# Patient Record
Sex: FEMALE | Race: WHITE | Employment: UNEMPLOYED | URBAN - METROPOLITAN AREA
[De-identification: names, ages, dates, MRNs, and addresses within clinical notes are randomized per-mention and may not be internally consistent; named-entity substitution may affect disease eponyms.]

---

## 2020-07-01 ENCOUNTER — OFFICE VISIT (OUTPATIENT)
Dept: NEUROLOGY | Age: 55
End: 2020-07-01

## 2020-07-01 VITALS
HEART RATE: 97 BPM | OXYGEN SATURATION: 98 % | HEIGHT: 65 IN | RESPIRATION RATE: 18 BRPM | BODY MASS INDEX: 30.99 KG/M2 | DIASTOLIC BLOOD PRESSURE: 82 MMHG | SYSTOLIC BLOOD PRESSURE: 120 MMHG | TEMPERATURE: 98.2 F | WEIGHT: 186 LBS

## 2020-07-01 DIAGNOSIS — R41.89 SUBJECTIVE MEMORY COMPLAINTS: Primary | ICD-10-CM

## 2020-07-01 RX ORDER — MULTIVITAMIN
1 TABLET ORAL DAILY
COMMUNITY

## 2020-07-01 RX ORDER — DENOSUMAB 60 MG/ML
60 INJECTION SUBCUTANEOUS
COMMUNITY

## 2020-07-01 RX ORDER — PANTOPRAZOLE SODIUM 40 MG/1
40 TABLET, DELAYED RELEASE ORAL 2 TIMES DAILY
COMMUNITY
Start: 2017-07-11

## 2020-07-01 RX ORDER — NITROGLYCERIN 0.4 MG/1
0.4 TABLET SUBLINGUAL
COMMUNITY
Start: 2017-07-03

## 2020-07-01 RX ORDER — NALOXONE HYDROCHLORIDE 4 MG/.1ML
SPRAY NASAL
COMMUNITY
Start: 2020-03-20

## 2020-07-01 RX ORDER — MELATONIN
2000 DAILY
COMMUNITY

## 2020-07-01 RX ORDER — TIZANIDINE 4 MG/1
4 TABLET ORAL
COMMUNITY
Start: 2020-03-20

## 2020-07-01 RX ORDER — SENNOSIDES 8.6 MG/1
2 TABLET ORAL
COMMUNITY
Start: 2020-03-20

## 2020-07-01 RX ORDER — ONDANSETRON 4 MG/1
4 TABLET, ORALLY DISINTEGRATING ORAL
COMMUNITY
Start: 2016-09-23

## 2020-07-01 RX ORDER — ASPIRIN 81 MG/1
81 TABLET ORAL 2 TIMES DAILY
COMMUNITY
Start: 2020-03-20

## 2020-07-01 RX ORDER — HYDROCODONE BITARTRATE AND ACETAMINOPHEN 7.5; 325 MG/1; MG/1
1 TABLET ORAL
COMMUNITY
Start: 2020-03-20

## 2020-07-01 RX ORDER — ALBUTEROL SULFATE 90 UG/1
AEROSOL, METERED RESPIRATORY (INHALATION)
COMMUNITY
Start: 2020-06-03 | End: 2020-07-01 | Stop reason: SDUPTHER

## 2020-07-01 RX ORDER — ALBUTEROL SULFATE 90 UG/1
AEROSOL, METERED RESPIRATORY (INHALATION)
COMMUNITY
Start: 2020-06-03

## 2020-07-01 RX ORDER — POLYETHYLENE GLYCOL 3350 17 G/17G
17 POWDER, FOR SOLUTION ORAL
COMMUNITY
Start: 2016-12-09

## 2020-07-01 NOTE — PROGRESS NOTES
Referring Physician: Self-referred    Reason for Consultation:  ? Parkinsons     Chief Complaint: tremor in the head     History of Present Illness:   Joe Young is a 50 y.o. female with a history of asthma, osteoporosis and interstitial cystitis who presents to neurology clinic for evaluation of a head tremor and balance problems. Reports that she has been experiencing symptoms of tremor in her head which is noted at rest for the last 6 months. Believes that this may have been ongoing prior to this however it has been noticed more by her  in the last few months. She reports that this usually occurs at rest when she is washing the dishes. She does report that she has some tightness in the neck however attributes this to a recent cervical spine surgery. She also does believe that her tremor is worse when she is anxious and if she is tired. She also reports that she may have had some intermittent tremor of her right upper extremity however this occurs very infrequently. She denies any trouble with her smell or constipation. She does not have any blurry vision or double vision. She reports her swallowing is normal and has not noticed any changes in her voice. Additionally patient does report that she has been having difficulty with her balance. She reports that she has been stumbling when walking occasionally and has noted veering to one side or the other. Denies taking any small shuffling steps and does not feel that her legs are heavy. She also complains of having memory troubles which is described more as losing her chain of thought. She frequently forgets what she was going to save midsentence. Denies any trouble with remembering people's names or repeating stories. She does endorse that she has been leaving the stove on and her  at times will need to turn it off. She also leaves the water running at times.   Denies getting lost while driving    Medical hx   Asthma Osteoporosis  GERD   Interstitial cystitis     Surgical Hx  Cholecystectomy   Tubal ligation   Cervical spine   Lumbar spine     Family History   Problem Relation Age of Onset    Parkinsonism Mother         Social History     Tobacco Use    Smoking status: Former Smoker    Smokeless tobacco: Never Used   Substance Use Topics    Alcohol use: Not Currently        Not on File     Prior to Admission medications    Medication Sig Start Date End Date Taking? Authorizing Provider   albuterol (PROVENTIL HFA, VENTOLIN HFA, PROAIR HFA) 90 mcg/actuation inhaler  6/3/20  Yes Provider, Historical   cholecalciferol (VITAMIN D3) (1000 Units /25 mcg) tablet Take 2,000 Units by mouth daily. Yes Provider, Historical   denosumab (Prolia) 60 mg/mL injection 60 mg by SubCUTAneous route. Yes Provider, Historical   ondansetron (ZOFRAN ODT) 4 mg disintegrating tablet Take 4 mg by mouth every eight (8) hours as needed. 9/23/16  Yes Provider, Historical   pantoprazole (PROTONIX) 40 mg tablet Take 40 mg by mouth two (2) times a day. 7/11/17  Yes Provider, Historical   polyethylene glycol (MIRALAX) 17 gram packet Take 17 g by mouth. 12/9/16  Yes Provider, Historical   aspirin delayed-release 81 mg tablet Take 81 mg by mouth two (2) times a day. 3/20/20   Provider, Historical   calcium-cholecalciferol, D3, (CALTRATE 600+D) tablet Take 1 Tab by mouth daily. Provider, Historical   HYDROcodone-acetaminophen (NORCO) 7.5-325 mg per tablet Take 1 Tab by mouth every four (4) hours as needed. 3/20/20   Provider, Historical   naloxone (NARCAN) 4 mg/actuation nasal spray Administer a single spray intranasally into one nostril if concern for overdose. Call 911. May repeat x1 in 2 to 3 minutes using a new nasal spray. 3/20/20   Provider, Historical   nitroglycerin (NITROSTAT) 0.4 mg SL tablet Take 0.4 mg by mouth. 7/3/17   Provider, Historical   senna (SENOKOT) 8.6 mg tablet Take 2 Tabs by mouth nightly.  3/20/20   Provider, Historical tiZANidine (ZANAFLEX) 4 mg tablet Take 4 mg by mouth every eight (8) hours as needed. 3/20/20   Provider, Historical       Review of Systems:  General, constitutional: negative  Eyes, vision: negative  Ears, nose, throat: negative  Cardiovascular, heart: negative  Respiratory: negative  Gastrointestinal: negative  Genitourinary: negative  Musculoskeletal: negative  Skin and integumentary: negative  Psychiatric: negative  Endocrine: negative  Neurological: negative, except for HPI  Hematologic/lymphatic: negative  Allergy/immunology: negative    Visit Vitals  /82   Pulse 97   Temp 98.2 °F (36.8 °C) (Oral)   Resp 18   Ht 5' 5\" (1.651 m)   Wt 186 lb (84.4 kg)   SpO2 98%   BMI 30.95 kg/m²       Physical Exam:  General:  no acute distress  Neck: no carotid bruits  Lungs: clear to auscultation  Heart:  no murmurs, regular rate and rhythm   Lower extremity: no edema    Neurological exam:  Mental Status: Awake, alert, oriented to person, place and time  Registration and Recall: registration intact, able to recall 3/3 words correctly at 5 min   Attention and Concentration: able to state the days of the week backwards   Speech and Language: No dysarthria. Able to name, repeat and follow commands   Fund of knowledge was preserved    Cranial nerves: II-XII  Pupils equal and reactive, visual fields intact by confrontation   Extraocular movements intact, no evidence of nystagmus or ptosis   Facial sensation intact   Facial movements symmetric   Hearing intact to soft rub bilaterally   Shoulder shrug symmetric and strong   Tongue protrusion full and midline without fasciculation or atrophy    Motor:   Normal tone and Bulk no cogwheel rigidity  Drift: No evidence of pronator drift   Abnormal movements: When checking for finger tapping's a slight head tremor was noted for a few seconds. Strength testing:   deltoid triceps biceps Wrist ext. Wrist flex. intrinsics   Right 5 5 5 5 5 5   Left 5 5 5 5 5 5      Hip flex.  Hip ext. Knee ext. Knee flex Dorsi flex Plantar flex   Right  5 5 5 5 5 5   Left  5 5 5 5 5 5       Sensory:  Sensation intact to light touch, no extinction     Reflexes:     Biceps Triceps  Brachiorad Patellar Achilles Plantar Hoffmans   Right  2 2 2 2 2 Down Neg   Left  2 2 2 2 2 Down Neg        Cerebellar testing:  No dysmetria. Normal rapid alternating movements; finger-to-nose and heel-to- shin testing are within normal limits. Finger tapping and foot stopping did reveal slight decrement in the size of her motions. Romberg: absent    Gait: steady. Did veer from one side to the other when walking down the hallway. Data:     No data for review is available. Assessment and Plan   Ivette High is a 50 y.o. female with a history of asthma, osteoporosis and interstitial cystitis who presents to neurology clinic for evaluation of a head tremor and balance problems of unclear etiology. The tremor as well as gait instability was not noted on my examination today. She did not have any features supportive of Parkinson's disorder at this time however given her family history she potentially may develop this disorder. Her neurological exam was essentially unremarkable.  -We will obtain an MRI of the brain to rule out any organic cause for her head tremor and gait instability.  -I did suggest that patient could undergo physical therapy however patient stated that her symptoms are not constant and would like to wait on this.  -Patient did inquire about genetic testing for Parkinson's and I discussed that we could potentially consider this if her symptoms were to worsen.  -We also discussed the possibility of a DaTscan to determine if she has evidence of early Parkinson's.     Memory complaints: Appear to be subjective as her neurological exam did not reveal any difficulty with recall.  -We will obtain neuropsychological testing to determine if there are true memory complaints or if she has underlying depression or anxiety which may be contributing to this. I have discussed the diagnosis with the patient and the intended plan as seen in the above orders. Patient is in agreement. The patient has received an after-visit summary and questions were answered concerning future plans. I have discussed medication side effects and warnings with the patient as well.         Signed By:  Yolanda Councilman, MD     July 1, 2020

## 2020-07-01 NOTE — PATIENT INSTRUCTIONS
Parkinson's Disease: Care Instructions Your Care Instructions Parkinson's disease can cause tremors, stiffness, and problems with movement. Severe or advanced cases can also cause problems with thinking. In Parkinson's disease, part of the brain cannot make enough dopamine, a chemical that helps control movement. Taking your medicines correctly and getting regular exercise may help you maintain your quality of life. There are many things that can cause Parkinson's disease symptoms, including some medicine, some toxins, and trauma to the head. The cause in most cases is not known. Follow-up care is a key part of your treatment and safety. Be sure to make and go to all appointments, and call your doctor if you are having problems. It's also a good idea to know your test results and keep a list of the medicines you take. How can you care for yourself at home? General care · Take your medicines exactly as prescribed. Call your doctor if you think you are having a problem with your medicine. · Make sure your home is safe: 
? Place furniture so that you have something to hold on to as you walk around the house. ? Use chairs that make it easier to sit down and stand up. ? Group the things you use most, such as reading glasses, keys, and the telephone, in one easy-to-reach place. ? Tack down rugs so that you do not trip. ? Put no-slip tape and handrails in the tub to prevent falls. · Use a cane, walker, or scooter if your doctor suggests it. · Keep up your normal activities as much as you can. · Find ways to manage stress, which can make symptoms worse. · Spend time with family and friends. Join a support group for people with Parkinson's disease if you want extra help. · Depression is common with this condition. Tell your doctor if you have trouble sleeping, are eating too much or are not hungry, or feel sad or tearful all the time. Depression can be treated with medicine and counseling. Diet and exercise · Eat a balanced diet. · If you are taking levodopa, do not eat protein at the same time you take your medicine. Levodopa may not work as well if you take it at the same time you eat protein. You can eat normal amounts of protein. Talk to your doctor if you have questions. · If you have problems swallowing, change how and what you eat: ? Try thick drinks, such as milk shakes. They are easier to swallow than other fluids. ? Do not eat foods that crumble easily. These can cause choking. ? Use a  to prepare food. Soft foods need less chewing. ? Eat small meals often so that you do not get tired from eating heavy meals. · Drink plenty of water and eat a high-fiber diet to prevent constipation. Parkinson'sand the medicines that treat itmay slow your intestines. · Get exercise on most days. Work with your doctor to set up a program of walking, swimming, or other exercise you are able to do. When should you call for help? Call your doctor now or seek immediate medical care if: 
· You have a change in your symptoms. · You develop other problems from your condition, such as: 
? Injury from a fall. ? Thinking or memory problems. ? A urinary tract infection (burning pain when urinating). Watch closely for changes in your health, and be sure to contact your doctor if: 
· You lose weight because of problems with eating. · You want more information about your condition or your medicines. Where can you learn more? Go to http://aung-romana.info/ Enter F625 in the search box to learn more about \"Parkinson's Disease: Care Instructions. \" Current as of: November 20, 2019               Content Version: 12.5 © 0888-4385 [employer]. Care instructions adapted under license by [employer] (which disclaims liability or warranty for this information).  If you have questions about a medical condition or this instruction, always ask your healthcare professional. [address] any warranty or liability for your use of this information.

## 2020-09-15 ENCOUNTER — VIRTUAL VISIT (OUTPATIENT)
Dept: NEUROLOGY | Age: 55
End: 2020-09-15
Payer: MEDICAID

## 2020-09-15 DIAGNOSIS — G31.84 MILD COGNITIVE IMPAIRMENT WITH MEMORY LOSS: Primary | ICD-10-CM

## 2020-09-15 PROCEDURE — 96116 NUBHVL XM PHYS/QHP 1ST HR: CPT | Performed by: PSYCHOLOGIST
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
## 2020-09-25 ENCOUNTER — TELEPHONE (OUTPATIENT)
Dept: NEUROLOGY | Age: 55
End: 2020-09-25

## 2020-10-07 ENCOUNTER — VIRTUAL VISIT (OUTPATIENT)
Dept: NEUROLOGY | Age: 55
End: 2020-10-07
Payer: COMMERCIAL

## 2020-10-07 DIAGNOSIS — R41.3 MEMORY IMPAIRMENT: Primary | ICD-10-CM

## 2020-10-07 PROCEDURE — 99214 OFFICE O/P EST MOD 30 MIN: CPT | Performed by: PSYCHIATRY & NEUROLOGY

## 2020-10-07 NOTE — PROGRESS NOTES
Neurology Note    Patient ID:  Vickie Beck  [id number]  12 y.o.  1965      Date of Consultation:  October 7, 2020    This is a telemedicine visit that was performed with in the originating site at patient's home and the distance site at [facility] outpatient clinic at [facility].  This telemedicine visit utilized synchronous (real-time) audio-video technology. Verbal consent to participate in the video visit was obtained. This visit occurred during the corona (COVID -19) public health emergency. I discussed with the patient the nature of our telemedicine visit, that:  - I would evaluate the patient and recommend diagnostic and treatment based on my assessment  - Our sessions are not being recorded and that personal health information is protected  - Our team will provide follow-up care in person if and when the patient needs it. Consent:  The patient is aware that this patient-initiated Telehealth encounter is a billable service, with coverage as determined by the patient's insurance carrier. The patient is aware that they may receive a bill and has provided verbal consent to proceed:     Subjective:     CC: tremor    History of Present Illness:   Vickie Beck is a 50 y.o. female with a history of asthma, osteoporosis and interstitial cystitis who presents to neurology clinic for ongoing evaluation of a head tremor and cognitive impairment. Interval history  Since patient's last visit approximately 3 months ago she has had no progression or improvement in her head tremor. Additionally she continues to complain of issues with her memory. She did undergo neuropsych testing which showed a mild cognitive impairment. She does report that her head tremor is usually present when she is distracted with certain tasks such as washing dishes. She says that this does not cause her significant discomfort.     Past Medical History:   Diagnosis Date    Asthma     Bronchitis     Gastrointestinal disorder     GERD    GERD (gastroesophageal reflux disease)     Thromboembolus (HCC)     DVT behind knees        Past Surgical History:   Procedure Laterality Date    HX CHOLECYSTECTOMY      HX GYN      tubaligation    HX ORTHOPAEDIC      back, leg, arm        Family History   Problem Relation Age of Onset    Parkinsonism Mother     Heart Attack Father     Heart Disease Father 62    Other Father         MVA    Other Daughter         pulm stenosis    Other Son         CP    Breast Cancer Mother 44    Cancer Sister         endometrial cancer        Social History     Tobacco Use    Smoking status: Former Smoker    Smokeless tobacco: Never Used   Substance Use Topics    Alcohol use: Not Currently        Allergies   Allergen Reactions    Bee Sting [Sting, Bee] Anaphylaxis    Other Medication Anaphylaxis     Bee stings        Prior to Admission medications    Medication Sig Start Date End Date Taking? Authorizing Provider   albuterol (PROVENTIL HFA, VENTOLIN HFA, PROAIR HFA) 90 mcg/actuation inhaler  6/3/20  Yes Provider, Historical   cholecalciferol (VITAMIN D3) (1000 Units /25 mcg) tablet Take 2,000 Units by mouth daily. Yes Provider, Historical   denosumab (Prolia) 60 mg/mL injection 60 mg by SubCUTAneous route. Yes Provider, Historical   HYDROcodone-acetaminophen (NORCO) 7.5-325 mg per tablet Take 1 Tab by mouth every four (4) hours as needed. 3/20/20  Yes Provider, Historical   ondansetron (ZOFRAN ODT) 4 mg disintegrating tablet Take 4 mg by mouth every eight (8) hours as needed. 9/23/16  Yes Provider, Historical   nitroglycerin (NITROSTAT) 0.4 mg SL tablet Take 0.4 mg by mouth. 7/3/17  Yes Provider, Historical   aspirin delayed-release 81 mg tablet Take 81 mg by mouth two (2) times a day. 3/20/20   Provider, Historical   calcium-cholecalciferol, D3, (CALTRATE 600+D) tablet Take 1 Tab by mouth daily.     Provider, Historical   naloxone [facility]) 4 mg/actuation nasal spray Administer a single spray intranasally into one nostril if concern for overdose. Call 911. May repeat x1 in 2 to 3 minutes using a new nasal spray. 3/20/20   Provider, Historical   pantoprazole (PROTONIX) 40 mg tablet Take 40 mg by mouth two (2) times a day. 7/11/17   Provider, Historical   polyethylene glycol (MIRALAX) 17 gram packet Take 17 g by mouth. 12/9/16   Provider, Historical   senna (SENOKOT) 8.6 mg tablet Take 2 Tabs by mouth nightly. 3/20/20   Provider, Historical   tiZANidine (ZANAFLEX) 4 mg tablet Take 4 mg by mouth every eight (8) hours as needed. 3/20/20   Provider, Historical   ibuprofen (MOTRIN) 600 mg tablet Take 1 Tab by mouth every six (6) hours as needed for Pain. 7/4/12   Gema Covington MD   acetaminophen (TYLENOL) 325 mg tablet Take  by mouth every four (4) hours as needed. Provider, Historical   RANITIDINE HCL (ZANTAC PO) Take  by mouth. Provider, Historical   OMEPRAZOLE (PRILOSEC PO) Take  by mouth. Provider, Historical       Review of Systems:    General, constitutional: negative  Eyes, vision: negative  Ears, nose, throat: negative  Cardiovascular, heart: negative  Respiratory: negative  Gastrointestinal: negative  Genitourinary: negative  Musculoskeletal: negative  Skin and integumentary: negative  Psychiatric: negative  Endocrine: negative  Neurological: negative, except for HPI  Hematologic/lymphatic: negative  Allergy/immunology: negative    Objective:     No vital signs were obtained via telemedicine today. There are limitations to the neurological examination due to the technological features of telemedicine    Physical Exam:  General:  appears well nourished in no acute distress  Respiratory:  good respiratory effort. No labored breathing  Skin: intact. No obvious erythematous rashes    Neurological exam:    Awake, alert, oriented to person, place and time  Attention and concentration were intact  Language was intact.   There was no aphasia  Speech: no dysarthria  Fund of knowledge was preserved    Cranial nerves:   Visual fields were full  Eomi, no evidence of nystagmus  Facial motor: normal and symmetric  Hearing intact    Tongue: midline without fasciculations    Motor:   No evidence of fasciculations    Strength testing:  Appears to be full strength in the upper and lower extremities. There is no abnormal movements. Sensory:  Intact per patient. Reflexes:  Unable to obtain via telemedicine    Cerebellar testing:  no tremor apparent, finger/nose and juana were intact    Romberg: absent    Labs:     Lab Results   Component Value Date/Time    Sodium 140 04/13/2012 08:16 AM    Potassium 4.2 04/13/2012 08:16 AM    Chloride 102 04/13/2012 08:16 AM    Glucose 100 (H) 04/13/2012 08:16 AM    BUN 12 04/13/2012 08:16 AM    Creatinine 0.59 04/13/2012 08:16 AM    Calcium 9.0 04/13/2012 08:16 AM    WBC 6.4 04/13/2012 08:16 AM    HCT 39.4 04/13/2012 08:16 AM    HGB 12.6 04/13/2012 08:16 AM    PLATELET 069 39/69/8381 08:16 AM       Imaging:  No imaging available for review     Assessment and Kyleigh Harris is a 50 y.o. female with a history of asthma, osteoporosis and interstitial cystitis who presents to neurology clinic for evaluation of a head tremor and balance problems of unclear etiology. The tremor as well as gait instability was not noted on my examination today. She did not have any features supportive of Parkinson's disorder at this time however given her family history she potentially may develop this disorder.   Her neurological exam was essentially unremarkable.  -We will obtain an MRI of the brain to rule out any organic cause for her head tremor and gait instability.  -I did suggest that patient could undergo physical therapy however patient stated that her symptoms are not constant and would like to wait on this.  -Patient did inquire about genetic testing for Parkinson's and I discussed that we could potentially consider this if her symptoms were to worsen.  -We also discussed the possibility of a DaTscan to determine if she has evidence of early Parkinson's.     Memory complaints: Appear to be subjective as her neurological exam did not reveal any difficulty with recall.  -Neuropsych testing did reveal mild cognitive impairment of unclear etiology. She is very young and it would be an unusual for her to have vascular dementia however there is increased suspicion for possible early onset Alzheimer's.  -We will need an MRI of the brain with and without contrast to rule out any volume loss or atrophy in the temporal parietal lobes.   -Patient to follow-up with Dr. Josefa Delong for the results of her neuropsych testing      Patient Active Problem List   Diagnosis Code    UTI (urinary tract infection) N39.0    [facility]is[facility] unspecified [facility] A41.9    Leukocytosis, unspecified D72.829    Nausea R11.0    Asthma J45.909                   Signed By:  Lorna Huitron MD     October 7, 2020